# Patient Record
(demographics unavailable — no encounter records)

---

## 2025-04-29 NOTE — HISTORY OF PRESENT ILLNESS
[5] : 5 [3] : 3 [Dull/Aching] : dull/aching [de-identified] : R RF stiffness and locking  L MF trigger is better R RF and L MF injections were done on 2/11/2025 [FreeTextEntry1] : hands

## 2025-04-29 NOTE — REASON FOR VISIT
[FreeTextEntry2] : 04/29/2025: 87 year old male here today for: follow up bilateral hands pain and trigger fingers

## 2025-04-29 NOTE — PHYSICAL EXAM
[de-identified] : R hand: Mild swelling ring finger Tender 4th A1 pulley Decreased ring ROM +ring triggering  L hand  FROM Nontender No trigger

## 2025-04-29 NOTE — ASSESSMENT
[FreeTextEntry1] :  Trigger finger is a progressive problem that once occurs will be a chronic issue that will likely continue until surgical treatment is necessary. Trigger finger creates a chronic change to the tendon/pulley system in the hand that will be present until surgical release. Treatment options for trigger finger include OTC NSAIDS, prescription NSAIDS, ice, splinting, OT, cortisone injection, and surgical release.   I recommend the patient take over the counter medication such as Advil/Motrin/Aleve or Tylenol for pain and inflammation  R RF tendon sheath injection was performed at the A1 pulley because of pain and inflammation under aseptic conditions with betadine and alcohol Anesthesia: ethyl chloride sprayed topically Celestone 6mg/1cc: An injection of Celestone 1cc Lidocaine: An injection of Lidocaine 1% 1cc Marcaine: An injection of Marcaine 0.5% 1cc 25G needle   The risks, benefits, and alternatives to cortisone injection were explained in full to the patient. Risks outlined include but are not limited to infection, sepsis, bleeding, scarring, skin discoloration, temporary increase in pain, syncopal episode, failure to resolve symptoms, allergic reaction, symptom recurrence, and elevation of blood sugar in diabetics. Patient understood the risks. All questions were answered. After discussion of options, patient verbally consented to an injection. Sterile prep was done of the injection site. Patient tolerated the procedure well. Advised to ice the injection site this evening.